# Patient Record
Sex: MALE | Race: WHITE | NOT HISPANIC OR LATINO | Employment: STUDENT | URBAN - METROPOLITAN AREA
[De-identification: names, ages, dates, MRNs, and addresses within clinical notes are randomized per-mention and may not be internally consistent; named-entity substitution may affect disease eponyms.]

---

## 2024-02-26 ENCOUNTER — APPOINTMENT (EMERGENCY)
Dept: RADIOLOGY | Facility: HOSPITAL | Age: 19
End: 2024-02-26
Payer: COMMERCIAL

## 2024-02-26 ENCOUNTER — HOSPITAL ENCOUNTER (EMERGENCY)
Facility: HOSPITAL | Age: 19
Discharge: HOME | End: 2024-02-26
Attending: FAMILY MEDICINE
Payer: COMMERCIAL

## 2024-02-26 VITALS
TEMPERATURE: 96.6 F | WEIGHT: 215 LBS | RESPIRATION RATE: 16 BRPM | SYSTOLIC BLOOD PRESSURE: 144 MMHG | BODY MASS INDEX: 27.59 KG/M2 | OXYGEN SATURATION: 99 % | HEIGHT: 74 IN | HEART RATE: 64 BPM | DIASTOLIC BLOOD PRESSURE: 91 MMHG

## 2024-02-26 DIAGNOSIS — S42.009A CLAVICLE FRACTURE: Primary | ICD-10-CM

## 2024-02-26 PROCEDURE — 73030 X-RAY EXAM OF SHOULDER: CPT

## 2024-02-26 PROCEDURE — 73000 X-RAY EXAM OF COLLAR BONE: CPT

## 2024-02-26 PROCEDURE — 99284 EMERGENCY DEPT VISIT MOD MDM: CPT

## 2024-02-26 PROCEDURE — 99283 EMERGENCY DEPT VISIT LOW MDM: CPT

## 2024-02-26 RX ORDER — OXYCODONE HYDROCHLORIDE 5 MG/1
5 TABLET ORAL ONCE
Status: COMPLETED | OUTPATIENT
Start: 2024-02-26 | End: 2024-02-26

## 2024-02-26 RX ORDER — OXYCODONE HYDROCHLORIDE 5 MG/1
5 TABLET ORAL EVERY 6 HOURS PRN
Qty: 8 TABLET | Refills: 0 | Status: SHIPPED | OUTPATIENT
Start: 2024-02-26 | End: 2024-03-05

## 2024-02-26 RX ADMIN — OXYCODONE HYDROCHLORIDE 5 MG: 5 TABLET ORAL at 13:31

## 2024-02-26 NOTE — DISCHARGE INSTRUCTIONS
Tylenol/Motrin for moderate pain  Oxycodone for severe pain  Rest, Ice, Elevation, Sling    Follow up with orthopedics at home state  Return to ER if symptoms worsen

## 2024-02-26 NOTE — Clinical Note
Jb Lindsay was seen and treated in our emergency department on 2/26/2024.        No work until cleared by Family Doctor/Orthopedics        Diagnosis:     Jb  .    He may return on this date:          If you have any questions or concerns, please don't hesitate to call.      LEONELA Starks    ______________________________           _______________          _______________  Hospital Representative                              Date                                Time

## 2024-02-26 NOTE — ED PROVIDER NOTES
History  Chief Complaint   Patient presents with    Shoulder Injury     Left shoulder/clavicle pain from fall.     18-year-old male presents ER for evaluation of snowboarding injury.  Patient stated that [place] he caught an edge and catapulted him to a snow bank striking his left shoulder.  Patient stated that he instantly had severe shoulder pain.  No noted ecchymosis, erythema or tenting.  There is noted deformity of the left clavicle.  Patient does have neurovascularly intact and sensation equal bilaterally.  Patient is right-hand dominant.      Shoulder Injury  Associated symptoms: no back pain and no fever        None       History reviewed. No pertinent past medical history.    History reviewed. No pertinent surgical history.    History reviewed. No pertinent family history.  I have reviewed and agree with the history as documented.    E-Cigarette/Vaping     E-Cigarette/Vaping Substances     Social History     Tobacco Use    Smoking status: Never    Smokeless tobacco: Never   Substance Use Topics    Alcohol use: Not Currently    Drug use: Not Currently       Review of Systems   Constitutional:  Negative for chills and fever.   Respiratory:  Negative for cough and shortness of breath.    Cardiovascular:  Negative for chest pain and palpitations.   Gastrointestinal:  Negative for abdominal pain, nausea and vomiting.   Genitourinary:  Negative for dysuria and hematuria.   Musculoskeletal:  Positive for arthralgias (left shoulder). Negative for back pain.   Skin:  Negative for color change and rash.   Neurological:  Negative for seizures and syncope.   All other systems reviewed and are negative.      Physical Exam  Physical Exam  Vitals and nursing note reviewed.   Constitutional:       General: He is not in acute distress.     Appearance: He is well-developed.   HENT:      Head: Normocephalic and atraumatic.      Right Ear: External ear normal.      Left Ear: External ear  normal.   Eyes:      Conjunctiva/sclera: Conjunctivae normal.   Cardiovascular:      Rate and Rhythm: Normal rate and regular rhythm.      Pulses: Normal pulses.      Heart sounds: Normal heart sounds.   Pulmonary:      Effort: Pulmonary effort is normal. No respiratory distress.      Breath sounds: Normal breath sounds.   Abdominal:      Palpations: Abdomen is soft.      Tenderness: There is no abdominal tenderness.   Musculoskeletal:      Left shoulder: Deformity and tenderness (Left clavicle) present. Decreased range of motion. Normal strength. Normal pulse.      Cervical back: Neck supple.   Skin:     General: Skin is warm and dry.      Capillary Refill: Capillary refill takes less than 2 seconds.   Neurological:      Mental Status: He is alert and oriented to person, place, and time. Mental status is at baseline.   Psychiatric:         Mood and Affect: Mood normal.         Behavior: Behavior normal.         Vital Signs  ED Triage Vitals   Temperature Pulse Respirations Blood Pressure SpO2   02/26/24 1253 02/26/24 1253 02/26/24 1253 02/26/24 1254 02/26/24 1253   (!) 96.6 °F (35.9 °C) 64 16 144/91 99 %      Temp Source Heart Rate Source Patient Position - Orthostatic VS BP Location FiO2 (%)   02/26/24 1253 02/26/24 1253 02/26/24 1253 02/26/24 1253 --   Tympanic Monitor Sitting Right arm       Pain Score       02/26/24 1253       1           Vitals:    02/26/24 1253 02/26/24 1254   BP:  144/91   Pulse: 64    Patient Position - Orthostatic VS: Sitting Sitting         Visual Acuity      ED Medications  Medications   oxyCODONE (ROXICODONE) IR tablet 5 mg (5 mg Oral Given 2/26/24 1331)       Diagnostic Studies  Results Reviewed       None                   XR clavicle LEFT    (Results Pending)   XR shoulder 2+ views LEFT    (Results Pending)              Procedures  Procedures         ED Course         CRAFFT      Flowsheet Row Most Recent Value   CRADEBRA Initial Screen: During the past 12 months, did you:    1. Drink  "any alcohol (more than a few sips)?  No Filed at: 02/26/2024 1255   2. Smoke any marijuana or hashish No Filed at: 02/26/2024 1255   3. Use anything else to get high? (\"anything else\" includes illegal drugs, over the counter and prescription drugs, and things that you sniff or 'jospeh')? No Filed at: 02/26/2024 1255                                            Medical Decision Making  Patient was found to have a closed fracture of his left shoulder.  Patient is otherwise well-appearing, hemodynamically stable, and shows no evidence of neurovascular injury, dislocation, ligamentous injury or compartment syndrome.  Patient was placed in sling and advised to follow-up with orthopedics where he lives in [place].  X-ray disc sent with patient and prescription for oxycodone sent for pain control.  Patient advised to return to the ER if symptoms worsens or questions or concerns arise at home.    Amount and/or Complexity of Data Reviewed  Radiology: ordered.    Risk  Prescription drug management.             Disposition  Final diagnoses:   Clavicle fracture     Time reflects when diagnosis was documented in both MDM as applicable and the Disposition within this note       Time User Action Codes Description Comment    2/26/2024  1:26 PM Bette Gordon Add [S42.009A] Clavicle fracture           ED Disposition       ED Disposition   Discharge    Condition   Stable    Date/Time   Mon Feb 26, 2024 1326    Comment   Jb Lindsay discharge to home/self care.                   Follow-up Information       Follow up With Specialties Details Why Contact Info Additional Information    [facility]   [address] [facility] [facility] [id number] [phone] [facility] [place], [address] [place] [address], [phone], [phone]            Discharge Medication List as of 2/26/2024  1:28 PM        START taking these medications    Details   oxyCODONE (Roxicodone) 5 immediate " release tablet Take 1 tablet (5 mg total) by mouth every 6 (six) hours as needed for moderate pain for up to 8 days Max Daily Amount: 20 mg, Starting Mon 2/26/2024, Until Tue 3/5/2024 at 2359, Print             No discharge procedures on file.    PDMP Review       None            ED Provider  Electronically Signed by             LEONEAL Starks  02/26/24 7947